# Patient Record
Sex: FEMALE | Race: WHITE | URBAN - METROPOLITAN AREA
[De-identification: names, ages, dates, MRNs, and addresses within clinical notes are randomized per-mention and may not be internally consistent; named-entity substitution may affect disease eponyms.]

---

## 2022-02-22 ENCOUNTER — OFFICE VISIT (OUTPATIENT)
Dept: URBAN - METROPOLITAN AREA CLINIC 71 | Facility: CLINIC | Age: 52
End: 2022-02-22
Payer: COMMERCIAL

## 2022-02-22 DIAGNOSIS — H52.4 PRESBYOPIA: ICD-10-CM

## 2022-02-22 DIAGNOSIS — H04.123 DRY EYE SYNDROME OF BILATERAL LACRIMAL GLANDS: Primary | ICD-10-CM

## 2022-02-22 PROCEDURE — 99204 OFFICE O/P NEW MOD 45 MIN: CPT | Performed by: OPTOMETRIST

## 2022-02-22 ASSESSMENT — VISUAL ACUITY
OD: 20/20
OS: 20/20

## 2022-02-22 ASSESSMENT — KERATOMETRY
OD: 46.75
OS: 47.00

## 2022-02-22 ASSESSMENT — INTRAOCULAR PRESSURE
OS: 21
OD: 18

## 2022-02-22 NOTE — IMPRESSION/PLAN
Impression: Presbyopia: H52.4. Plan: Presbyopia is the inability to focus on objects (ie: accommodate) due to the loss of flexibility of your natural lens. Presbyopia occurs with age. Reading glasses, bifocals, trifocals or contacts can be helpful. Contact the office if difficulty focusing persists despite corrective eye wear. New glasses RX given today for DVO. PT takes her glasses off to read. Discussed the change in the prescription compared to the old glasses.

## 2022-02-22 NOTE — IMPRESSION/PLAN
Impression: Dry eye syndrome of bilateral lacrimal glands: H04.123. Plan: Dry eye syndrome requires lubrication of the eye with artificial tears and nighttime ointments or gels. Contact the office if dry eye does not improve, worsens or blurs vision. Recommend starting 3-4 drops daily of Systane Balance. PT ok to follow annually. We dont have to dilate every year. Every other year is ok.

## 2023-06-13 ENCOUNTER — OFFICE VISIT (OUTPATIENT)
Dept: URBAN - METROPOLITAN AREA CLINIC 71 | Facility: CLINIC | Age: 53
End: 2023-06-13
Payer: COMMERCIAL

## 2023-06-13 DIAGNOSIS — H52.4 PRESBYOPIA: ICD-10-CM

## 2023-06-13 DIAGNOSIS — H25.13 AGE-RELATED NUCLEAR CATARACT, BILATERAL: Primary | ICD-10-CM

## 2023-06-13 DIAGNOSIS — H04.123 DRY EYE SYNDROME OF BILATERAL LACRIMAL GLANDS: ICD-10-CM

## 2023-06-13 PROCEDURE — 99213 OFFICE O/P EST LOW 20 MIN: CPT | Performed by: OPTOMETRIST

## 2023-06-13 ASSESSMENT — INTRAOCULAR PRESSURE
OS: 19
OD: 17

## 2023-06-13 ASSESSMENT — VISUAL ACUITY: OD: 20/20

## 2023-06-13 NOTE — IMPRESSION/PLAN
Impression: Age-related nuclear cataract, bilateral: H25.13. Trace NS OU stable Plan: Cataracts account for the patient's complaints. No treatment currently recommended. The patient will monitor vision changes and contact us with any decrease in vision.

## 2023-06-13 NOTE — IMPRESSION/PLAN
Impression: Dry eye syndrome of bilateral lacrimal glands: H04.123. 
Decreased tear meniscus Plan: Recommended lubricating drops such as Systane Complete BID or as needed